# Patient Record
Sex: MALE | Race: AMERICAN INDIAN OR ALASKA NATIVE
[De-identification: names, ages, dates, MRNs, and addresses within clinical notes are randomized per-mention and may not be internally consistent; named-entity substitution may affect disease eponyms.]

---

## 2018-03-12 ENCOUNTER — HOSPITAL ENCOUNTER (OUTPATIENT)
Dept: HOSPITAL 5 - SPVIMAG | Age: 58
Discharge: HOME | End: 2018-03-12
Attending: ORTHOPAEDIC SURGERY
Payer: MEDICARE

## 2018-03-12 DIAGNOSIS — M17.12: Primary | ICD-10-CM

## 2018-03-12 NOTE — XRAY REPORT
XRAY LEFT KNEE 4 THREE VIEWS: 03/12/18 



CLINICAL: Left knee pain.



FINDINGS: No fracture or dislocation.  Mild medial joint space 

osteoarthritis with mild narrowing of joint space and a small medial 

osteophyte.  The lateral joint space is normal.  Minimal patellofemoral 

joint arthritis.No joint effusion.  The tibial tubercle is prominent 

but not fragmented.  Normal soft tissues.



IMPRESSION: Mild osteoarthritis.  A prominent tibial tubercle suggests 

a possible history of Osgood-Hunt disease.

## 2022-12-08 ENCOUNTER — OUT OF OFFICE VISIT (OUTPATIENT)
Dept: URBAN - METROPOLITAN AREA MEDICAL CENTER 12 | Facility: MEDICAL CENTER | Age: 62
End: 2022-12-08
Payer: MEDICARE

## 2022-12-08 DIAGNOSIS — K29.60 ADENOPAPILLOMATOSIS GASTRICA: ICD-10-CM

## 2022-12-08 DIAGNOSIS — K21.00 ALKALINE REFLUX ESOPHAGITIS: ICD-10-CM

## 2022-12-08 DIAGNOSIS — K92.1 ACUTE MELENA: ICD-10-CM

## 2022-12-08 DIAGNOSIS — Z79.1 ENCNTR LONG-TERM NSAID USE: ICD-10-CM

## 2022-12-08 DIAGNOSIS — K25.9 ANTRAL ULCER: ICD-10-CM

## 2022-12-08 DIAGNOSIS — B96.81 BACTERIAL INFECTION DUE TO H. PYLORI: ICD-10-CM

## 2022-12-08 PROCEDURE — G8427 DOCREV CUR MEDS BY ELIG CLIN: HCPCS | Performed by: STUDENT IN AN ORGANIZED HEALTH CARE EDUCATION/TRAINING PROGRAM

## 2022-12-08 PROCEDURE — 99223 1ST HOSP IP/OBS HIGH 75: CPT | Performed by: STUDENT IN AN ORGANIZED HEALTH CARE EDUCATION/TRAINING PROGRAM

## 2022-12-08 PROCEDURE — 43239 EGD BIOPSY SINGLE/MULTIPLE: CPT | Performed by: INTERNAL MEDICINE

## 2022-12-09 ENCOUNTER — OUT OF OFFICE VISIT (OUTPATIENT)
Dept: URBAN - METROPOLITAN AREA MEDICAL CENTER 12 | Facility: MEDICAL CENTER | Age: 62
End: 2022-12-09
Payer: MEDICARE

## 2022-12-09 DIAGNOSIS — K92.1 ACUTE MELENA: ICD-10-CM

## 2022-12-09 DIAGNOSIS — K25.9 ANTRAL ULCER: ICD-10-CM

## 2022-12-09 DIAGNOSIS — Z79.1 ENCNTR LONG-TERM NSAID USE: ICD-10-CM

## 2022-12-09 PROCEDURE — 99233 SBSQ HOSP IP/OBS HIGH 50: CPT | Performed by: PHYSICIAN ASSISTANT

## 2022-12-16 ENCOUNTER — TELEPHONE ENCOUNTER (OUTPATIENT)
Dept: URBAN - METROPOLITAN AREA CLINIC 92 | Facility: CLINIC | Age: 62
End: 2022-12-16

## 2023-01-11 ENCOUNTER — OFFICE VISIT (OUTPATIENT)
Dept: URBAN - METROPOLITAN AREA CLINIC 92 | Facility: CLINIC | Age: 63
End: 2023-01-11